# Patient Record
(demographics unavailable — no encounter records)

---

## 2025-04-30 NOTE — PHYSICAL EXAM
[Nasal Endoscopy Performed] : nasal endoscopy was performed, see procedure section for findings [de-identified] : post op redxn [Normal] : mucosa is normal [Midline] : trachea located in midline position

## 2025-04-30 NOTE — HISTORY OF PRESENT ILLNESS
[de-identified] : 41 yo  h/o nasal surgery in 2023 and has dry nose also h/o GERD under tx w/ GI MD

## 2025-04-30 NOTE — PHYSICAL EXAM
[Nasal Endoscopy Performed] : nasal endoscopy was performed, see procedure section for findings [de-identified] : post op redxn [Normal] : mucosa is normal [Midline] : trachea located in midline position

## 2025-04-30 NOTE — HISTORY OF PRESENT ILLNESS
[de-identified] : 41 yo  h/o nasal surgery in 2023 and has dry nose also h/o GERD under tx w/ GI MD

## 2025-04-30 NOTE — PROCEDURE
[de-identified] : Ryan [de-identified] :  Anterior Rhinoscopy insufficient to account for symptoms.  After informed verbal consent is obtained, the fiberoptic nasal endoscope #3 is passed via the right nasal cavity. The following anatomic sites were directly examined in a sequential fashion: The scope was introduced in both  nasal passages between the middle and inferior turbinates to exam the inferior portion of the middle meatus and the fontanelle, as well as the maxillary ostia.  Next, the scope was passed medially and posteriorly to the middle turbinates to examine the sphenoethmoid recess and the superior turbinate region.   There is [ 0 ]% obstruction of the nasopharynx with adenoid tissue.  A sl deviated nasal septum was noted NOT causing obstruction. The turbinates were congested-bilateral.  Right Side: 	Mucosa: wnl 	Mucous: none 	Polyp: none 	Inferior Turbinate: small 	Middle Turbinate:wnl 	Superior Turbinate: wnl 	Inferior Meatus:clear 	Middle Meatus: clear 	Super Meatus: clear 	Sphenoethmoidal Recess: wnl    Left Side: 	Mucosa: wnl 	Mucous:none 	Polyp: none 	Inferior Turbinate: small 	Middle Turbinate: wnl 	Superior Turbinate:wnl 	Inferior Meatus: clear 	Middle Meatus: clear 	Super Meatus:clear 	Sphenoethmoidal Recess: wnl

## 2025-04-30 NOTE — PROCEDURE
[de-identified] : Ryan [de-identified] :  Anterior Rhinoscopy insufficient to account for symptoms.  After informed verbal consent is obtained, the fiberoptic nasal endoscope #3 is passed via the right nasal cavity. The following anatomic sites were directly examined in a sequential fashion: The scope was introduced in both  nasal passages between the middle and inferior turbinates to exam the inferior portion of the middle meatus and the fontanelle, as well as the maxillary ostia.  Next, the scope was passed medially and posteriorly to the middle turbinates to examine the sphenoethmoid recess and the superior turbinate region.   There is [ 0 ]% obstruction of the nasopharynx with adenoid tissue.  A sl deviated nasal septum was noted NOT causing obstruction. The turbinates were congested-bilateral.  Right Side: 	Mucosa: wnl 	Mucous: none 	Polyp: none 	Inferior Turbinate: small 	Middle Turbinate:wnl 	Superior Turbinate: wnl 	Inferior Meatus:clear 	Middle Meatus: clear 	Super Meatus: clear 	Sphenoethmoidal Recess: wnl    Left Side: 	Mucosa: wnl 	Mucous:none 	Polyp: none 	Inferior Turbinate: small 	Middle Turbinate: wnl 	Superior Turbinate:wnl 	Inferior Meatus: clear 	Middle Meatus: clear 	Super Meatus:clear 	Sphenoethmoidal Recess: wnl